# Patient Record
Sex: MALE | Race: WHITE | Employment: OTHER | URBAN - METROPOLITAN AREA
[De-identification: names, ages, dates, MRNs, and addresses within clinical notes are randomized per-mention and may not be internally consistent; named-entity substitution may affect disease eponyms.]

---

## 2020-09-17 ENCOUNTER — LAB REQUISITION (OUTPATIENT)
Dept: LAB | Facility: HOSPITAL | Age: 85
End: 2020-09-17
Payer: MEDICARE

## 2020-09-17 DIAGNOSIS — R82.90 UNSPECIFIED ABNORMAL FINDINGS IN URINE: ICD-10-CM

## 2020-09-17 LAB
BILIRUB UR QL: NEGATIVE
COLOR UR: YELLOW
GLUCOSE UR-MCNC: NEGATIVE MG/DL
HGB UR QL STRIP.AUTO: NEGATIVE
KETONES UR-MCNC: NEGATIVE MG/DL
NITRITE UR QL STRIP.AUTO: NEGATIVE
PH UR: 6 [PH] (ref 5–8)
PROT UR-MCNC: 30 MG/DL
RBC #/AREA URNS AUTO: 43 /HPF
SP GR UR STRIP: 1.02 (ref 1–1.03)
UROBILINOGEN UR STRIP-ACNC: <2
WBC #/AREA URNS AUTO: 836 /HPF

## 2020-09-17 PROCEDURE — 87077 CULTURE AEROBIC IDENTIFY: CPT | Performed by: UROLOGY

## 2020-09-17 PROCEDURE — 81001 URINALYSIS AUTO W/SCOPE: CPT | Performed by: UROLOGY

## 2020-09-17 PROCEDURE — 87186 SC STD MICRODIL/AGAR DIL: CPT | Performed by: UROLOGY

## 2020-09-17 PROCEDURE — 87086 URINE CULTURE/COLONY COUNT: CPT | Performed by: UROLOGY

## 2020-10-12 ENCOUNTER — APPOINTMENT (OUTPATIENT)
Dept: GENERAL RADIOLOGY | Facility: HOSPITAL | Age: 85
DRG: 689 | End: 2020-10-12
Attending: EMERGENCY MEDICINE
Payer: MEDICARE

## 2020-10-12 ENCOUNTER — HOSPITAL ENCOUNTER (INPATIENT)
Facility: HOSPITAL | Age: 85
LOS: 7 days | Discharge: NURSING, REHAB OR LONG-TERM CARE | DRG: 689 | End: 2020-10-19
Attending: EMERGENCY MEDICINE | Admitting: HOSPITALIST
Payer: MEDICARE

## 2020-10-12 ENCOUNTER — APPOINTMENT (OUTPATIENT)
Dept: CT IMAGING | Facility: HOSPITAL | Age: 85
DRG: 689 | End: 2020-10-12
Attending: EMERGENCY MEDICINE
Payer: MEDICARE

## 2020-10-12 DIAGNOSIS — I48.92 ATRIAL FIBRILLATION AND FLUTTER (HCC): ICD-10-CM

## 2020-10-12 DIAGNOSIS — I48.91 ATRIAL FIBRILLATION AND FLUTTER (HCC): ICD-10-CM

## 2020-10-12 DIAGNOSIS — N30.00 ACUTE CYSTITIS WITHOUT HEMATURIA: ICD-10-CM

## 2020-10-12 DIAGNOSIS — G93.41 METABOLIC ENCEPHALOPATHY: Primary | ICD-10-CM

## 2020-10-12 PROBLEM — R79.89 AZOTEMIA: Status: ACTIVE | Noted: 2020-10-12

## 2020-10-12 PROBLEM — R73.9 HYPERGLYCEMIA: Status: ACTIVE | Noted: 2020-10-12

## 2020-10-12 PROCEDURE — 99223 1ST HOSP IP/OBS HIGH 75: CPT | Performed by: HOSPITALIST

## 2020-10-12 PROCEDURE — 71045 X-RAY EXAM CHEST 1 VIEW: CPT | Performed by: EMERGENCY MEDICINE

## 2020-10-12 PROCEDURE — 70450 CT HEAD/BRAIN W/O DYE: CPT | Performed by: EMERGENCY MEDICINE

## 2020-10-12 PROCEDURE — 74177 CT ABD & PELVIS W/CONTRAST: CPT | Performed by: EMERGENCY MEDICINE

## 2020-10-12 RX ORDER — WARFARIN SODIUM 4 MG/1
4 TABLET ORAL NIGHTLY
Status: ON HOLD | COMMUNITY
End: 2020-10-16

## 2020-10-12 RX ORDER — LORAZEPAM 2 MG/ML
0.5 INJECTION INTRAMUSCULAR ONCE
Status: COMPLETED | OUTPATIENT
Start: 2020-10-12 | End: 2020-10-12

## 2020-10-12 RX ORDER — MELATONIN
50 DAILY
COMMUNITY

## 2020-10-12 RX ORDER — AMINO ACIDS/MV,IRON,MIN
1 TABLET ORAL
COMMUNITY

## 2020-10-12 RX ORDER — DILTIAZEM HYDROCHLORIDE 5 MG/ML
10 INJECTION INTRAVENOUS ONCE
Status: COMPLETED | OUTPATIENT
Start: 2020-10-12 | End: 2020-10-12

## 2020-10-12 RX ORDER — TAMSULOSIN HYDROCHLORIDE 0.4 MG/1
0.4 CAPSULE ORAL DAILY
COMMUNITY

## 2020-10-12 RX ORDER — ACETAMINOPHEN 325 MG/1
650 TABLET ORAL EVERY 6 HOURS PRN
Status: DISCONTINUED | OUTPATIENT
Start: 2020-10-12 | End: 2020-10-19

## 2020-10-12 RX ORDER — MELATONIN
1000 DAILY
COMMUNITY

## 2020-10-12 RX ORDER — MELATONIN
800 DAILY
COMMUNITY

## 2020-10-12 RX ORDER — ACETAMINOPHEN 325 MG/1
325 TABLET ORAL EVERY 6 HOURS PRN
COMMUNITY

## 2020-10-12 RX ORDER — SODIUM CHLORIDE 0.9 % (FLUSH) 0.9 %
3 SYRINGE (ML) INJECTION AS NEEDED
Status: DISCONTINUED | OUTPATIENT
Start: 2020-10-12 | End: 2020-10-19

## 2020-10-12 RX ORDER — METOCLOPRAMIDE HYDROCHLORIDE 5 MG/ML
10 INJECTION INTRAMUSCULAR; INTRAVENOUS EVERY 8 HOURS PRN
Status: DISCONTINUED | OUTPATIENT
Start: 2020-10-12 | End: 2020-10-19

## 2020-10-12 RX ORDER — MULTIVIT-MIN/IRON FUM/FOLIC AC 7.5 MG-4
1 TABLET ORAL DAILY
COMMUNITY

## 2020-10-12 RX ORDER — ATORVASTATIN CALCIUM 10 MG/1
10 TABLET, FILM COATED ORAL NIGHTLY
COMMUNITY

## 2020-10-12 RX ORDER — BETHANECHOL CHLORIDE 25 MG/1
25 TABLET ORAL 3 TIMES DAILY
Status: ON HOLD | COMMUNITY
Start: 2020-10-06 | End: 2020-10-16

## 2020-10-12 RX ORDER — POLYETHYLENE GLYCOL 3350 17 G/17G
17 POWDER, FOR SOLUTION ORAL DAILY PRN
COMMUNITY

## 2020-10-12 RX ORDER — CYCLOBENZAPRINE HCL 5 MG
5 TABLET ORAL 2 TIMES DAILY
Status: ON HOLD | COMMUNITY
End: 2020-10-16

## 2020-10-12 RX ORDER — ONDANSETRON 2 MG/ML
4 INJECTION INTRAMUSCULAR; INTRAVENOUS EVERY 6 HOURS PRN
Status: DISCONTINUED | OUTPATIENT
Start: 2020-10-12 | End: 2020-10-19

## 2020-10-12 RX ORDER — MINERAL OIL AND PETROLATUM 150; 830 MG/G; MG/G
OINTMENT OPHTHALMIC DAILY
COMMUNITY

## 2020-10-12 RX ORDER — SODIUM CHLORIDE 9 MG/ML
INJECTION, SOLUTION INTRAVENOUS CONTINUOUS
Status: DISCONTINUED | OUTPATIENT
Start: 2020-10-12 | End: 2020-10-13

## 2020-10-12 RX ORDER — WARFARIN SODIUM 6 MG/1
6 TABLET ORAL NIGHTLY
Status: ON HOLD | COMMUNITY
End: 2020-10-16

## 2020-10-12 RX ORDER — ASPIRIN 81 MG/1
81 TABLET, CHEWABLE ORAL DAILY
COMMUNITY

## 2020-10-12 RX ORDER — METOPROLOL TARTRATE 5 MG/5ML
5 INJECTION INTRAVENOUS EVERY 4 HOURS
Status: DISCONTINUED | OUTPATIENT
Start: 2020-10-12 | End: 2020-10-12

## 2020-10-12 RX ORDER — CHOLECALCIFEROL (VITAMIN D3) 125 MCG
500 CAPSULE ORAL DAILY
COMMUNITY

## 2020-10-12 RX ORDER — METOPROLOL SUCCINATE 50 MG/1
50 TABLET, EXTENDED RELEASE ORAL DAILY
Status: ON HOLD | COMMUNITY
End: 2020-10-16

## 2020-10-12 RX ORDER — ASCORBIC ACID 500 MG
500 TABLET ORAL DAILY
COMMUNITY

## 2020-10-12 RX ORDER — TRAMADOL HYDROCHLORIDE 50 MG/1
50 TABLET ORAL EVERY 6 HOURS PRN
Status: ON HOLD | COMMUNITY
End: 2020-10-16

## 2020-10-12 RX ORDER — METOPROLOL SUCCINATE 100 MG/1
100 TABLET, EXTENDED RELEASE ORAL DAILY
Status: ON HOLD | COMMUNITY
End: 2020-10-16

## 2020-10-12 NOTE — ED NOTES
Orders for admission, patient is aware of plan and ready to go upstairs. Any questions, please call ED RN Radha Mims  at extension [phone]. Pt sent from [place] [facility] for increased AMS.  Pt is normally confused and disoriented due to dementia but today the nurs

## 2020-10-12 NOTE — ED PROVIDER NOTES
Patient Seen in: [facility] AND [facility] Emergency Department      History   Patient presents with:  Altered Mental Status    Stated Complaint: ams    HPI    Presents to the emergency department with altered mental status.   History is obtained from the paramed Mouth: Mucous membranes are moist.   Eyes:      Extraocular Movements: Extraocular movements intact. Conjunctiva/sclera: Conjunctivae normal.      Pupils: Pupils are equal, round, and reactive to light.    Neck:      Musculoskeletal: Normal range of mo other components within normal limits   CBC W/ DIFFERENTIAL - Abnormal; Notable for the following components:    WBC 13.6 (*)     RDW-SD 51.2 (*)     Neutrophil Absolute Prelim 9.61 (*)     Neutrophil Absolute 9.61 (*)     Monocyte Absolute 1.33 (*)     Al 10/12/2020 at 2:51 PM          Ct Abdomen Pelvis Iv Contrast, No Oral (er)    Result Date: 10/12/2020  CONCLUSION:   Bladder wall thickening, greater than what is expected for under distention can be seen with cystitis.  Correlation with urinalysis suggeste

## 2020-10-12 NOTE — ED NOTES
Pt straight catheter used to collect urine from pt. Pt tolerated well. Redness, swelling, foul smell, and purulent drainage noted to akosua area and urethral meatus.

## 2020-10-12 NOTE — H&P
[facility]    PATIENT'S NAME: Bernabe Hodge   ATTENDING PHYSICIAN: Cecelia Stephen.  Israel Paula MD   PATIENT ACCOUNT#:   [id number]    LOCATION:  [place] [address]  MEDICAL RECORD #:   [id number]       YOB: 1935  ADMISSION DATE:       10/1 able to provide any further details. MEDICATIONS:  Please see medication reconciliation list.     ALLERGIES:  No known drug allergies. FAMILY HISTORY:  Unable to obtain from patient. SOCIAL HISTORY:  Lives in a nursing facility.   Requires assi 15:58:21  t: 10/12/2020 16:32:42  Job [id number]  FB/

## 2020-10-12 NOTE — CM/SW NOTE
CM spoke with POA daughter Laure Rodriguez via phone.  patient lives at [facility] and per Laure Rodriguez \"I think my dad needs more care than what [facility] can provide\"  patient has a history of [facility]    Daughter requesting Psych MD consult, 3rd floor Rn w

## 2020-10-12 NOTE — ED INITIAL ASSESSMENT (HPI)
Pt arrived via medics to rm 38, from [facility], for complaint of ams, per medics staff at [facility] state that pt last known well was yesterday, pt refused VS via medics, unknown procedure last week, medics also noticed dried blood in pts toilet.   Pt has becom

## 2020-10-12 NOTE — CONSULTS
[facility]OSP - [facility]    Cardiology Consultation  [facility]    Susan Redding Patient Status:  Emergency    1935 MRN [id number]   Location [address] Attending MD En Gamble file    Social History Narrative    None on file      Current Medications:    •  cefTRIAXone Sodium (ROCEPHIN) 1 g in sodium chloride 0.9% 100 mL MBP/ADD-vantage, 1 g, Intravenous, Once    •  Meropenem (MERREM) 500 mg in sodium chloride 0.9% 100 mL MBP, 50 (VITAMIN B-6) 50 MG tablet **ONE-HALF (1/2) TABLET** (25MG) BY MOUTH ONCE DAILY   • Pyridoxine HCl (VITAMIN B-6, PYRIDOXINE,) 25 MG tablet Take 25 mg by mouth daily. • Sulfacetamide Sodium-Sulfur 10-5 % Lotion Apply 1 application topically daily.    • rodriguez or dullness. Normal excursions and effort. Abdomen: Soft, non-tender. No organosplenomegally, mass or rebound, BS-present. Extremities: Without clubbing, cyanosis or edema. Peripheral pulses are 2+. Neurologic: Alert and oriented, normal affect.  No fo Contrast, No Oral (er)    Result Date: 10/12/2020  CONCLUSION:   Bladder wall thickening, greater than what is expected for under distention can be seen with cystitis. Correlation with urinalysis suggested.   Small foci of gas are seen within the bladder wh

## 2020-10-13 PROCEDURE — 99233 SBSQ HOSP IP/OBS HIGH 50: CPT | Performed by: HOSPITALIST

## 2020-10-13 RX ORDER — TAMSULOSIN HYDROCHLORIDE 0.4 MG/1
0.4 CAPSULE ORAL DAILY
Status: DISCONTINUED | OUTPATIENT
Start: 2020-10-13 | End: 2020-10-19

## 2020-10-13 RX ORDER — BETHANECHOL CHLORIDE 25 MG/1
25 TABLET ORAL 3 TIMES DAILY
Status: DISCONTINUED | OUTPATIENT
Start: 2020-10-13 | End: 2020-10-19

## 2020-10-13 RX ORDER — CYCLOBENZAPRINE HCL 5 MG
5 TABLET ORAL 2 TIMES DAILY PRN
Status: DISCONTINUED | OUTPATIENT
Start: 2020-10-13 | End: 2020-10-19

## 2020-10-13 RX ORDER — TRAMADOL HYDROCHLORIDE 50 MG/1
50 TABLET ORAL EVERY 6 HOURS PRN
Status: DISCONTINUED | OUTPATIENT
Start: 2020-10-13 | End: 2020-10-19

## 2020-10-13 RX ORDER — MELATONIN
800 DAILY
Status: DISCONTINUED | OUTPATIENT
Start: 2020-10-13 | End: 2020-10-19

## 2020-10-13 RX ORDER — MINERAL OIL AND PETROLATUM 150; 830 MG/G; MG/G
OINTMENT OPHTHALMIC DAILY
Status: DISCONTINUED | OUTPATIENT
Start: 2020-10-13 | End: 2020-10-19

## 2020-10-13 RX ORDER — POLYETHYLENE GLYCOL 3350 17 G/17G
17 POWDER, FOR SOLUTION ORAL DAILY PRN
Status: DISCONTINUED | OUTPATIENT
Start: 2020-10-13 | End: 2020-10-19

## 2020-10-13 RX ORDER — MELATONIN
50 DAILY
Status: DISCONTINUED | OUTPATIENT
Start: 2020-10-13 | End: 2020-10-19

## 2020-10-13 RX ORDER — WARFARIN SODIUM 4 MG/1
4 TABLET ORAL
Status: COMPLETED | OUTPATIENT
Start: 2020-10-13 | End: 2020-10-13

## 2020-10-13 RX ORDER — CHOLECALCIFEROL (VITAMIN D3) 125 MCG
500 CAPSULE ORAL DAILY
Status: DISCONTINUED | OUTPATIENT
Start: 2020-10-13 | End: 2020-10-19

## 2020-10-13 RX ORDER — ASPIRIN 81 MG/1
81 TABLET, CHEWABLE ORAL DAILY
Status: DISCONTINUED | OUTPATIENT
Start: 2020-10-13 | End: 2020-10-19

## 2020-10-13 RX ORDER — HALOPERIDOL 5 MG/ML
1 INJECTION INTRAMUSCULAR EVERY 6 HOURS PRN
Status: DISCONTINUED | OUTPATIENT
Start: 2020-10-13 | End: 2020-10-14

## 2020-10-13 RX ORDER — ATORVASTATIN CALCIUM 10 MG/1
10 TABLET, FILM COATED ORAL NIGHTLY
Status: DISCONTINUED | OUTPATIENT
Start: 2020-10-13 | End: 2020-10-19

## 2020-10-13 RX ORDER — MULTIPLE VITAMINS W/ MINERALS TAB 9MG-400MCG
1 TAB ORAL DAILY
Status: DISCONTINUED | OUTPATIENT
Start: 2020-10-13 | End: 2020-10-19

## 2020-10-13 RX ORDER — SODIUM CHLORIDE 450 MG/100ML
INJECTION, SOLUTION INTRAVENOUS CONTINUOUS
Status: DISCONTINUED | OUTPATIENT
Start: 2020-10-13 | End: 2020-10-14

## 2020-10-13 RX ORDER — ASCORBIC ACID 500 MG
500 TABLET ORAL DAILY
Status: DISCONTINUED | OUTPATIENT
Start: 2020-10-13 | End: 2020-10-19

## 2020-10-13 NOTE — CONSULTS
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 -  FU cx.  -  Follow fever curve, wbc. -  Reviewed labs, micro, imaging reports, available old records. -  Case d/w patient, RN.     Thank you for allowing us to par

## 2020-10-13 NOTE — CM/SW NOTE
Received MDO for d/c planning. Per RN rounds, pt is extremely confused and slightly combative. JALEN contacted pt's POA HC/dtr, Therese, via phone. She confirmed pt lives at [facility] and has been there since the end of July 2020.  She stated Fredo castellanos initiated referral.     DON screen requested. Liseth at [facility] requests a call when SNF placement is finalized. Need insurance authorization prior to d/c.       PLAN: SNF, pend accept & dtr's final choice, pend med clear    SW/CM to remain available f

## 2020-10-13 NOTE — PROGRESS NOTES
[facility]    Cardiology Progress Note  [facility]    Lo Walker Patient Status:  Inpatient    1935 MRN [id number]   Location [facility] 3W/SW Attending Wallis Lombard, MD   Hosp Day # 1 PCP [facility] Net I/O     -- [phone]          Vent Settings:      Hemodynamic parameters (last 24 hours):      Scheduled Meds:   • metoprolol Tartrate  25 mg Oral Q8H [facility]   • meropenem  500 mg Intravenous Q8H       Continuous Infusions:   • sodium chloride 100 mL/hr 10/12/2020 at 2:48 PM     Finalized by (CST): Christine Davila MD on 10/12/2020 at 2:51 PM          Ct Abdomen Pelvis Iv Contrast, No Oral (er)    Result Date: 10/12/2020  CONCLUSION:   Bladder wall thickening, greater than what is expected for under [facility] long-acting as able.             Kassandra August MD  10/13/2020

## 2020-10-13 NOTE — PHYSICAL THERAPY NOTE
Chart reviewed , attempt x 2   in PM  .   Per RN [id number] Angelica Sung to try again this PM .      Pt received again supine in bed with food tray . Per tray food delivered approximately 1 hour ago. Therapy again introducing therapy POC , herlinda Dey present.

## 2020-10-13 NOTE — PHYSICAL THERAPY NOTE
Chart reviewed ,  RN approve participation. Pt received supine in bed , meal tray just arriving. Therapy encouraging pt participation to get up in chair for meal.    Therapy POC introduced. Pt declining participation at this time .   \" I am not get

## 2020-10-13 NOTE — PLAN OF CARE
Problem: Patient Centered Care  Goal: Patient preferences are identified and integrated in the patient's plan of care  Description: Interventions:  - What would you like us to know as we care for you?   - Provide timely, complete, and accurate informatio attention, thought processing and/or memory  Description: Interventions:    Outcome: Progressing     Problem: Patient/Family Goals  Goal: Patient/Family Long Term Goal  Description: Patient's Long Term Goal:     Interventions:  -   - See additional Care Pl

## 2020-10-13 NOTE — PLAN OF CARE
Pt is somnolent and lethargic. Was not able to give PO meds safely. Cardizem gtt @ 5ml, 0.9 @ 100ml, and meropenem @ 33ml. Plan is for PT/OT eval and psych consult.     Problem: Patient Centered Care  Goal: Patient preferences are identified and integrated Encourage visually impaired, hearing impaired and aphasic patients to use assistive/communication devices  Outcome: Progressing     Problem: Impaired Cognition  Goal: Patient will exhibit improved attention, thought processing and/or memory  Description: I

## 2020-10-13 NOTE — OCCUPATIONAL THERAPY NOTE
OCCUPATIONAL THERAPY EVALUATION - INPATIENT     Room Number: 348/348-A  Evaluation Date: 10/13/2020  Type of Evaluation: Initial  Presenting Problem: AMS, elevated INR, acute cystitis    Physician Order: IP Consult to Occupational Therapy  Reason for [facility] Plan: Balance activities; Energy conservation/work simplification techniques;ADL training;Functional transfer training; Endurance training;Patient/Family education;Cognitive reorientation;Equipment eval/education; Compensatory technique education       [address] [facility] functional limits     STRENGTH ASSESSMENT  Upper extremity strength is within functional limits     COORDINATION  Gross Motor: WFL   Fine Motor: WFL       ACTIVITIES OF DAILY LIVING ASSESSMENT  AM-PAC ‘6-Clicks’ Inpatient Daily Activity Short Form  How muc

## 2020-10-13 NOTE — PROGRESS NOTES
[place] [facility] - [facility]  Progress Note     Bin Lema  : 1935    Status: Inpatient  Day #: 1    Attending: Lucy Schofield MD  PCP: Sweetie Perez MD      Assessment and Plan     Acute metabolic encephalopathy 2/2 UTI  Dementia  -CT brain [facility] MCV 99.5 102.6*   MCH 33.6 33.2   MCHC 33.8 32.4   RDW 14.2 14.0   NEPRELIM 9.61* 6.87     Recent Labs   Lab 10/12/20  1343 10/13/20  0545   BUN 40* 27*   CREATSERUM 1.13 0.89   GFRAA 69 91   GFRNAA 60 79   CA 9.6 8.7    149*   K 5.0 4.4    1 10/12/2020 at 3:06 PM     Finalized by (CST): Alex Maharaj MD on 10/12/2020 at 3:18 PM          Ekg 12-lead    Result Date: 10/12/2020  ECG Report  Interpretation  -------------------------- Atrial Fibrillation with Rapid Ventricular Response ABNORMAL RHYT

## 2020-10-14 PROCEDURE — 99233 SBSQ HOSP IP/OBS HIGH 50: CPT | Performed by: HOSPITALIST

## 2020-10-14 RX ORDER — HALOPERIDOL 5 MG/ML
2 INJECTION INTRAMUSCULAR EVERY 6 HOURS PRN
Status: DISCONTINUED | OUTPATIENT
Start: 2020-10-14 | End: 2020-10-19

## 2020-10-14 RX ORDER — WARFARIN SODIUM 5 MG/1
5 TABLET ORAL NIGHTLY
Status: DISCONTINUED | OUTPATIENT
Start: 2020-10-14 | End: 2020-10-15

## 2020-10-14 NOTE — PROGRESS NOTES
[place] [facility] - [facility] PROGRESS NOTE    Luis Torres Patient Status:  Inpatient    1935 MRN [id number]   Location [facility] 3W/SW Attending Mee Rojas MD   Hosp Day # 2 PCP Baldo Barger MD     Subjective:  Valeria Mcgarry cx with ESBL E. 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 Will plan for likely two week course (EOT 10/27/20). Will place midline at this time.   -  Follow fever curve

## 2020-10-14 NOTE — PROGRESS NOTES
[facility]     [facility] Cardiology Progress Note    Aaron Marie Patient Status:  Inpatient    1935 MRN [id number]   Location [facility] 3W/SW Attending Bartolo Bamberger, MD   Hosp Day # 2 PCP Sandra Linder MD     Subjective:   Has * 85 98   BUN 40* 27* 20*   CREATSERUM 1.13 0.89 0.66*   GFRAA 69 91 103   GFRNAA 60 79 89   CA 9.6 8.7 7.9*    149* 144   K 5.0 4.4 3.9    117* 115*   CO2 24.0 23.0 25.0        Recent Labs   Lab 10/12/20  1343   TROP <0.045     No res

## 2020-10-14 NOTE — PLAN OF CARE
Patient remains safe in environment, bed alarm maintained. He is irritable at times, yells out loudly with the slightest touch. Occasionally belligerent - says that he needs things done \"the way he says\". Haldol given x1 for increased agitation.  Up to Monitor swallowing and airway patency with patient fatigue and changes in neurological status  - Encourage and assist patient to increase activity and self care with guidance from PT/OT  - Encourage visually impaired, hearing impaired and aphasic patients

## 2020-10-14 NOTE — PAYOR COMM NOTE
--------------  CONTINUED STAY REVIEW    Payor: Monique Marcos  #:  [id number]  Authorization Number: [id number]    10/13      A FIB CONTS IV CARDIZEM  UTI/ + AMS  ACUTE METABOLIC ENCEPHALOPATHY  CONFUSED, COMBATIVE  URINARY RETENTONS     CONTS ON CA

## 2020-10-14 NOTE — PHYSICAL THERAPY NOTE
PHYSICAL THERAPY EVALUATION - INPATIENT     Room Number: 348/348-A  Evaluation Date: 10/14/2020  Type of Evaluation: Initial   Physician Order: PT Eval and Treat    Presenting Problem: metabolic encephalopathy  Reason for Therapy: Mobility Dysfunction and Hyperglycemia    Acute cystitis without hematuria    Atrial fibrillation and flutter [facility])      Past Medical History  Past Medical History:   Diagnosis Date   • A-fib ([facility]    • Dementia ([facility]    • High blood pressure    • High cholesterol    • Myasthenia INPATIENT SHORT FORM - BASIC MOBILITY  How much difficulty does the patient currently have. ..  -   Turning over in bed (including adjusting bedclothes, sheets and blankets)?: A Little   -   Sitting down on and standing up from a chair with arms (e.g., whee with home activity/exercise instructions provided to patient in preparation for discharge.    Goal #5   Current Status    Goal #6    Goal #6  Current Status

## 2020-10-14 NOTE — PROGRESS NOTES
[place] [facility] - [facility]    Progress Note    Mic Luke Patient Status:  Inpatient    1935 MRN [id number]   Location [facility] 3W/SW Attending Benja Jimenez MD   Hosp Day # 2 PCP Pallavi Mcgowan MD       Subjective:     Pt denies martha expected for under distention can be seen with cystitis. Correlation with urinalysis suggested. Small foci of gas are seen within the bladder which are nonspecific. Correlate for recent intervention.   If none, a colovesicular fistula would need to be cli

## 2020-10-14 NOTE — PAYOR COMM NOTE
--------------  ADMISSION REVIEW     Payor: [address]            Patient Seen in: [facility] Emergency Department      History   Patient presents with:  Altered Mental Status    Stated Complaint: ams    Presents to the emergency department with al There is abdominal tenderness. There is no guarding or rebound. Musculoskeletal: Normal range of motion. General: No tenderness. Skin:     General: Skin is warm and dry. Findings: No rash.    Neurological:      General: No focal deficit pre CBC W/ DIFFERENTIAL[[id number]]          Abnormal            Final result                 Please view results for these tests on the individual orders.    TROPONIN I   RAINBOW DRAW BLUE   RAINBOW DRAW LAVENDER   RAINBOW DRAW LIGHT GREEN   RAINBOW DR comparison with prior imaging is recommended to demonstrate stability. If none available, followup nonemergent MRI of the lumbar spine can further characterize. Diverticulosis without diverticulitis. Hepatic steatosis.      Dictated by (CST): Aden Hirsch, fibrillation with rapid ventricular response with rate of 120. His urinalysis after straight cath was suggestive of urinary tract infection. Blood pressure upon arrival was 99/59. The patient was started on IV meropenem.   CT scan of the abdomen showed c sclerae. Pupils equal, round, and reactive. NECK:  Supple. No lymphadenopathy. Trachea midline. Full range of motion. LUNGS:  Clear to auscultation bilaterally. Normal respiratory effort. No intercostal retractions.    HEART:  Irregularly irregula Oral Althea Valdez RN      haloperidol lactate (HALDOL) 5 MG/ML injection 1 mg     Date Action Dose Route User    10/14/2020 0237 Given 1 mg Intravenous Patricia Cope RN      Meropenem (MERREM) 500 mg in sodium chloride 0.9% 100 mL MBP     Date Action

## 2020-10-14 NOTE — CM/SW NOTE
JALEN obtained list of accepting SNFs via [employer]. JALEN contacted pt's dtr/KATHERINE Damon and discussed w/ her. Per her request, JALEN emailed the list to: [email or web address].     Per Therese's request, JALEN also contacted pt's RN and Dr. Toby Hoyt and requested

## 2020-10-15 PROCEDURE — 99233 SBSQ HOSP IP/OBS HIGH 50: CPT | Performed by: HOSPITALIST

## 2020-10-15 PROCEDURE — 99222 1ST HOSP IP/OBS MODERATE 55: CPT | Performed by: NURSE PRACTITIONER

## 2020-10-15 RX ORDER — WARFARIN SODIUM 3 MG/1
6 TABLET ORAL NIGHTLY
Status: DISCONTINUED | OUTPATIENT
Start: 2020-10-15 | End: 2020-10-16

## 2020-10-15 RX ORDER — VANCOMYCIN HYDROCHLORIDE 125 MG/1
125 CAPSULE ORAL DAILY
Status: DISCONTINUED | OUTPATIENT
Start: 2020-10-15 | End: 2020-10-19

## 2020-10-15 RX ORDER — METOPROLOL TARTRATE 50 MG/1
50 TABLET, FILM COATED ORAL EVERY 8 HOURS SCHEDULED
Status: DISCONTINUED | OUTPATIENT
Start: 2020-10-15 | End: 2020-10-19

## 2020-10-15 NOTE — PROGRESS NOTES
[place] [facility] - [facility] ID PROGRESS NOTE    Jose Briceño Patient Status:  Inpatient    1935 MRN [id number]   Location [facility] 3W/SW Attending Ady Turner, [address] [facility] [address] Day # 3 PCP Yariel Villagomez MD     Subjective:  Bia Peacock on meropenem. Combative overnight.  ID consulted.     # ESBL E. Coli pyelonephritis  # AMS with pyuria with recent urine cx with ESBL E. 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

## 2020-10-15 NOTE — PROGRESS NOTES
[facility]    Progress Note    Troy Cornejo Patient Status:  Inpatient    1935 MRN [id number]   Location [facility] 3W/SW Attending Mary Sofia MD   Hosp Day # 3 PCP Steven Culver MD       Subjective:     Pt denies martha INR     CAD s/p stent  - cont meds and monitor     Hypernatremia  Resolved. - off IVF     Other:  -HTN  -HL  -h/o red cell aplasia  -chronic L1 vertebral compression fx  -myasthenia gravis     dvt proph:    Elevated INR on coumadin     Code status:     Ful

## 2020-10-15 NOTE — PLAN OF CARE
VS stable. Denies any pain. On room air. AFIB on tele. Pt alert and oriented but also confused and impulsive. Bed and chair alarm on. Frequent rounds done. On Meropenem IV. Midline to be placed tomorrow.  Telephone consent obtained from daughter Radha Escobedo kevin Encourage and assist patient to increase activity and self care with guidance from PT/OT  - Encourage visually impaired, hearing impaired and aphasic patients to use assistive/communication devices  Outcome: Progressing     Problem: Impaired Cognition  [facility]

## 2020-10-15 NOTE — PLAN OF CARE
Patient alert, confused at times. Reorientation required. No distress noted. HR remain elevated. 2 doses of metoprolol given. Bed locked, alarm on. Call light within reach. Will continue to monitor.    Problem: Patient Centered Care  Goal: Patient preferenc volume within ordered parameters to optimize cerebral perfusion and minimize risk of hemorrhage  - Monitor temperature, glucose, and sodium.  Initiate appropriate interventions as ordered  Outcome: Progressing  Goal: Achieves maximal functionality and self

## 2020-10-15 NOTE — PROGRESS NOTES
[facility]    Cardiology Progress Note  [facility]    Jenny Saul Patient Status:  Inpatient    1935 MRN [id number]   [facility] 3W/SW Attending Joshua Farah, [address] [facility]  Day # 3 PCP Juana Caraballo multivitamin with minerals  1 tablet Oral Daily   • vitamin B-6 (pyridoxine)  50 mg Oral Daily   • tamsulosin HCl  0.4 mg Oral Daily   • Vitamin B-12  500 mcg Oral Daily   • Vitamin C  500 mg Oral Daily   • cholecalciferol  1,000 Units Oral Daily   • merop bibasilar markings, left greater than right with minimal left basilar pleural reaction. Findings may be acute or chronic. No comparison studies.     Dictated by (CST): Ruma Ott MD on 10/12/2020 at 2:48 PM     Finalized by (CST): Mery Cortez persistently 110-130, increase metoprolol to 50 mg Q8hr today   - on coumadin     CAD s/p PCI LAD 2013:  - cont ASA, atorva, BB     Urosepsis: management as per primary svc    Valerie Batista MD  Cardiology [facility]  10/15/2020

## 2020-10-15 NOTE — CM/SW NOTE
10: 55AM  SW contacted pt's dtr, Therese. She confirmed she received list of accepting SNFs. She is still reviewing and confirmed she will reach out to SW w/ 1st and 2nd choice this afternoon.     01:00PM  Received call from pt's dtr inquiring about psychia

## 2020-10-15 NOTE — CONSULTS
[facility]  Report of Consultation    Aravind Thompson Patient Status:  Inpatient    1935 MRN [id number]   Location [facility] 3W/SW Attending Favian Khan MD   Hosp Day # 3 PCP Trav Tong MD was seen by urology Dr. Lyndon Solorio on 10/9 where he had noriega removal and trial of void. Noriega catheter was replaced this admission with unknown amount out upon placement, however 1200 ml is documented in first 7 hours post placement.       Patient denie by mouth daily as needed. •  tamsulosin (FLOMAX) cap, Take 0.4 mg by mouth daily. •  traMADol HCl 50 MG Oral Tab, Take 50 mg by mouth every 6 (six) hours as needed for Pain. •  Vitamin B-12 500 MCG Oral Tab, Take 500 mcg by mouth daily.     •  Guerline Gonsales Neurological: He is alert and oriented to person, place, and time. Skin: Skin is warm and dry. Psychiatric: He has a normal mood and affect.        Results:     Lab Results   Component Value Date    WBC 9.0 10/15/2020    HGB 12.1 (L) 10/15/2020    HCT for acute urological intervention at this time.  -Rest of care per primary care team  -Will follow peripherally    Discussed with patient, RN, and Dr. Oseas Arteaga     I spent 50 minutes with patient, and greater than 50% of this time was spent face to face [facility]

## 2020-10-16 ENCOUNTER — APPOINTMENT (OUTPATIENT)
Dept: PICC SERVICES | Facility: HOSPITAL | Age: 85
DRG: 689 | End: 2020-10-16
Attending: PHYSICIAN ASSISTANT
Payer: MEDICARE

## 2020-10-16 PROCEDURE — 05HC33Z INSERTION OF INFUSION DEVICE INTO LEFT BASILIC VEIN, PERCUTANEOUS APPROACH: ICD-10-PCS | Performed by: HOSPITALIST

## 2020-10-16 PROCEDURE — 99233 SBSQ HOSP IP/OBS HIGH 50: CPT | Performed by: HOSPITALIST

## 2020-10-16 RX ORDER — LIDOCAINE HYDROCHLORIDE 10 MG/ML
5 INJECTION, SOLUTION INFILTRATION; PERINEURAL AS NEEDED
Status: DISCONTINUED | OUTPATIENT
Start: 2020-10-16 | End: 2020-10-19

## 2020-10-16 RX ORDER — METOPROLOL TARTRATE 50 MG/1
50 TABLET, FILM COATED ORAL EVERY 8 HOURS SCHEDULED
Refills: 0 | Status: SHIPPED | COMMUNITY
Start: 2020-10-16

## 2020-10-16 RX ORDER — VANCOMYCIN HYDROCHLORIDE 125 MG/1
125 CAPSULE ORAL DAILY
Qty: 18 CAPSULE | Refills: 0 | Status: SHIPPED | OUTPATIENT
Start: 2020-10-16 | End: 2020-11-03

## 2020-10-16 RX ORDER — CYCLOBENZAPRINE HCL 5 MG
5 TABLET ORAL 2 TIMES DAILY PRN
Qty: 10 TABLET | Refills: 0 | Status: SHIPPED | OUTPATIENT
Start: 2020-10-16

## 2020-10-16 RX ORDER — BETHANECHOL CHLORIDE 25 MG/1
25 TABLET ORAL 3 TIMES DAILY
Qty: 21 TABLET | Refills: 0 | Status: SHIPPED | OUTPATIENT
Start: 2020-10-16 | End: 2020-10-23

## 2020-10-16 RX ORDER — WARFARIN SODIUM 7.5 MG/1
7.5 TABLET ORAL NIGHTLY
Refills: 0 | Status: SHIPPED | COMMUNITY
Start: 2020-10-16 | End: 2020-10-19

## 2020-10-16 RX ORDER — TRAMADOL HYDROCHLORIDE 50 MG/1
50 TABLET ORAL EVERY 6 HOURS PRN
Qty: 10 TABLET | Refills: 0 | Status: SHIPPED | OUTPATIENT
Start: 2020-10-16

## 2020-10-16 NOTE — PHYSICAL THERAPY NOTE
PHYSICAL THERAPY TREATMENT NOTE - INPATIENT     Room Number: 348/348-A       Presenting Problem: metabolic encephalopathy    Problem List  Principal Problem:    Metabolic encephalopathy  Active Problems:    Azotemia    Hyperglycemia    Acute cystitis witho Sitting: Good  Dynamic Sitting: Fair +           Static Standing: Fair  Dynamic Standing: Fair -    ACTIVITY TOLERANCE                         O2 WALK                  AM-PAC '6-Clicks' INPATIENT SHORT FORM - BASIC MOBILITY  How much difficulty does the pa rolling      Goal #2  Current Status SBA with RW   Goal #3 Patient is able to ambulate 100 feet with assist device: walker - rolling at assistance level: supervision   Goal #3   Current Status    SBA with RW SBA with RW amb 60' with a step though gait jose

## 2020-10-16 NOTE — CM/SW NOTE
11: 00AM   Received Vmail from pt's dtr, Therese, inquiring about clothes at time of d/c.    SW contacted Claude Arango ([phone]) / [facility] [place] - she confirmed that they can drop off clothes at [facility].  Per Claude Arango, she will reach out to [facility] AND [facility]

## 2020-10-16 NOTE — OCCUPATIONAL THERAPY NOTE
OCCUPATIONAL THERAPY TREATMENT NOTE - INPATIENT        Room Number: 348/348-A           Presenting Problem: AMS, elevated INR, acute cystitis    Problem List  Principal Problem:    Metabolic encephalopathy  Active Problems:    Azotemia    Hyperglycemia cooperative     OBJECTIVE  Precautions: Bed/chair alarm;Hard of hearing    WEIGHT BEARING RESTRICTION  Weight Bearing Restriction: None                PAIN ASSESSMENT  Ratin           ACTIVITY TOLERANCE      WNL                   O2 SATURATIONS      WN x/week    [facility] MOT/R  [address]  #[phone]

## 2020-10-16 NOTE — PROGRESS NOTES
[place] [facility] - [facility]    Progress Note    Susan Redding Patient Status:  Inpatient    1935 MRN [id number]   Location [facility] 3W/SW Attending Radha Johnson MD   Hosp Day # 4 PCP Joseph Allen MD        Subjective:     Tiffaniti with the above plan. Patient is an 82year old male with history of CAD s/p LAD PCI 2013, permanent afib, and chronic urinary retention who presented from assisted living facility with altered mental status. Found to have urosepsis, afib c RVR.  On exam has

## 2020-10-16 NOTE — PROGRESS NOTES
[facility]    Progress Note    Claudia Haji Patient Status:  Inpatient    1935 MRN [id number]   Location [facility] 3W/SW Attending Zi Branch MD   Hosp Day # 4 PCP Tamiko Sifuentes MD       Subjective:     Pt says he fe IVF     Other:  -HTN  -HL  -h/o red cell aplasia  -chronic L1 vertebral compression fx  -myasthenia gravis     dvt proph:    Elevated INR on coumadin     Code status:    Full      >35 minutes spent    Israel Carroll MD  10/16/2020

## 2020-10-17 PROCEDURE — 99232 SBSQ HOSP IP/OBS MODERATE 35: CPT | Performed by: HOSPITALIST

## 2020-10-17 NOTE — PLAN OF CARE
Patient is a/ox3, forgetful. RA. Up with assist x1 with the walker. Marcelo in place. L midline in place. Contact prec in place. IV antibiotics continued. Plan is JEMAL with insurance approval. Will continue to monitor.     Problem: Patient Centered Care  Goal: impaired and aphasic patients to use assistive/communication devices  Outcome: Progressing     Problem: Impaired Cognition  Goal: Patient will exhibit improved attention, thought processing and/or memory  Description: Interventions:  - Minimize distraction

## 2020-10-17 NOTE — PROGRESS NOTES
[facility]    Progress Note    Izzy Tierney Patient Status:  Inpatient    1935 MRN [id number]   Location [facility] 3W/SW Attending Uzair Fajardo, [address] [facility]  Day # 5 PCP Maryam Mello MD       Subjective:     No CP or SOB. monitor     Hypernatremia  Resolved.   - off IVF     Other:  -HTN  -HL  -h/o red cell aplasia  -chronic L1 vertebral compression fx  -myasthenia gravis     dvt proph:    Elevated INR on coumadin     Code status:    Full     Andres Cantu MD  10/17/2020

## 2020-10-17 NOTE — PROGRESS NOTES
[facility]    Progress Note    Claudia Haji Patient Status:  Inpatient    1935 MRN [id number]   Location [facility] 3W/SW Attending Zi Branch MD   Hosp Day # 5 PCP Tamiko Sifuentes MD        Subjective:     Constituti with changes and additions included within note (merged).   S/no cp  O/lung bcta  A/P:f/u labs  Lenient rate control afib    Vanessa Ignacio, [address] [facility]  Cardiac Electrophysiology      Gabe Rodriguez, MALCOM  10/17/2020

## 2020-10-17 NOTE — PLAN OF CARE
Patient still forgetful and impulsive overnight, but easily redirected. Midline in place, maintained IV abx. Chronic noriega in place but noticed patient is often pulling, dragging or stepping on tubing while ambulating.  Educated on proper noriega care, patien

## 2020-10-17 NOTE — PLAN OF CARE
Pt still displaying episodes of disorientation and impulsiveness. Educated multiple times on the importance of using the call light. Marcelo in place and intact. Midline placed today. IV meropenem infusing. Call light within reach.  Fall risk precautions in p emptying  Outcome: Progressing     Problem: NEUROLOGICAL - ADULT  Goal: Achieves stable or improved neurological status  Description: INTERVENTIONS  - Assess for and report changes in neurological status  - Initiate measures to prevent increased intracrani

## 2020-10-17 NOTE — CM/SW NOTE
SW received call from [employer] at [facility] who states after attempting insurance authorization this weekend, [employer] was unable to receive authorization as the insurance is closed over the weekend.      Per Paola Booth to follow up on Monday for insurance aut

## 2020-10-18 PROCEDURE — 99232 SBSQ HOSP IP/OBS MODERATE 35: CPT | Performed by: HOSPITALIST

## 2020-10-18 RX ORDER — WARFARIN SODIUM 5 MG/1
5 TABLET ORAL NIGHTLY
Status: DISCONTINUED | OUTPATIENT
Start: 2020-10-18 | End: 2020-10-19

## 2020-10-18 NOTE — PROGRESS NOTES
[facility]    Cardiology Progress Note    Gricelda Born Patient Status:  Inpatient    1935 MRN [id number]   Location [facility] 3W/SW Attending Yudy Waggoner MD   Hosp Day # 6 PCP Robertha Lennox, MD         Assessment and P 241.0 10/15/2020    CREATSERUM 0.81 10/16/2020    BUN 15 10/16/2020     10/16/2020    K 4.2 10/16/2020     10/16/2020    CO2 30.0 10/16/2020    GLU 93 10/16/2020    CA 8.5 10/16/2020    PTT 56.4 (H) 10/12/2020    INR 2.01 (H) 10/18/2020    MG 1

## 2020-10-18 NOTE — PROGRESS NOTES
[facility]    Progress Note    Ilir Diamond Patient Status:  Inpatient    1935 MRN [id number]   Location [facility] 3W/SW Attending Bev Arana MD   Hosp Day # 6 PCP Baron Shikha MD       Subjective:     Pt denies any INR     CAD s/p stent  - cont meds and monitor     Hypernatremia  Resolved.   - off IVF     Other:  -HTN  -HL  -h/o red cell aplasia  -chronic L1 vertebral compression fx  -myasthenia gravis     dvt proph:    Elevated INR on coumadin     Code status:    Ful 09-Sep-2019 21:45

## 2020-10-19 VITALS
OXYGEN SATURATION: 96 % | BODY MASS INDEX: 23.43 KG/M2 | TEMPERATURE: 98 F | HEIGHT: 72 IN | HEART RATE: 76 BPM | RESPIRATION RATE: 18 BRPM | DIASTOLIC BLOOD PRESSURE: 69 MMHG | SYSTOLIC BLOOD PRESSURE: 119 MMHG | WEIGHT: 173 LBS

## 2020-10-19 PROCEDURE — 99232 SBSQ HOSP IP/OBS MODERATE 35: CPT | Performed by: HOSPITALIST

## 2020-10-19 RX ORDER — WARFARIN SODIUM 5 MG/1
5 TABLET ORAL NIGHTLY
Qty: 30 TABLET | Refills: 0 | Status: SHIPPED | OUTPATIENT
Start: 2020-10-19

## 2020-10-19 NOTE — PROGRESS NOTES
[facility]    [facility] Note    Moira Tuckerer Patient Status:  Inpatient    1935 MRN [id number]   Location [facility] 3W/SW Attending Shellie Tian MD   Hosp Day # 7 PCP Sri Calhoun MD       Subjective:     No complaints monitor     Hypernatremia  Resolved.   - off IVF     Other:  -HTN  -HL  -h/o red cell aplasia  -chronic L1 vertebral compression fx  -myasthenia gravis     dvt proph:    Elevated INR on coumadin     Code status:    Full     Ella Delgado MD  10/19/2020

## 2020-10-19 NOTE — PLAN OF CARE
Pt remained stable overnight. On iv antibiotics. To go to rehab today.     Problem: Patient Centered Care  Goal: Patient preferences are identified and integrated in the patient's plan of care  Description: Interventions:  - What would you like us to know a Progressing     Problem: Impaired Cognition  Goal: Patient will exhibit improved attention, thought processing and/or memory  Description: Interventions:    Outcome: Progressing     Problem: Patient/Family Goals  Goal: Patient/Family Long Term Goal  Jonathon

## 2020-10-19 NOTE — PHYSICAL THERAPY NOTE
PHYSICAL THERAPY TREATMENT NOTE - INPATIENT     Room Number: 348/348-A       Presenting Problem: metabolic encephalopathy    Problem List  Principal Problem:    Metabolic encephalopathy  Active Problems:    Azotemia    Hyperglycemia    Acute cystitis witho Static Sitting: Good  Dynamic Sitting: Fair +           Static Standing: Poor +  Dynamic Standing: Poor +    ACTIVITY TOLERANCE                         O2 WALK assist device: walker - rolling at assistance level: supervision   Goal #3   Current Status    NT pt declined the use of the gait belt and was not safe to AMB without.        Goal #4     Goal #4   Current Status     Goal #5 Patient to demonstrate independen

## 2020-10-19 NOTE — DISCHARGE PLANNING
Patient is A/Ox 3, forgetful, denies CP, denies SOB. To be discharged to [address] [place] [address] today. Went over transfer instructions and importance of follow up appointments.  Explained any new and/or existing medications, their use, and side effects; specifica

## 2020-10-19 NOTE — PLAN OF CARE
Patient is a/ox3, forgetful. RA. Up with assist x1 with the walker. Marcelo in place. L midline in place. Contact prec in place. IV antibiotics continued. Plan is rehab with insurance approval. Will continue to monitor.     Problem: Patient [address] Encourage visually impaired, hearing impaired and aphasic patients to use assistive/communication devices  Outcome: Adequate for Discharge     Problem: Impaired Cognition  Goal: Patient will exhibit improved attention, thought processing and/or memory  Doug

## 2020-10-19 NOTE — OCCUPATIONAL THERAPY NOTE
OCCUPATIONAL THERAPY TREATMENT NOTE - INPATIENT        Room Number: 348/348-A           Presenting Problem: AMS, elevated INR, acute cystitis    Problem List  Principal Problem:    Metabolic encephalopathy  Active Problems:    Azotemia    Hyperglycemia ASSESSMENT  AM-PAC ‘6-Clicks’ Inpatient Daily Activity Short Form  How much help from another person does the patient currently need…  -   Putting on and taking off regular lower body clothing?: None  -   Bathing (including washing, rinsing, drying)?: ANNABEL Connor Saveanthony

## 2020-10-19 NOTE — CM/SW NOTE
11: 10AM  Received Vmail from pt's dtr, Nate Mistry, inquiring about status of insurance authorization.     SW contacted Milena gomes/ [facility] - she confirmed all clinicals have been submitted and insurance has given them a Reference # but no official author

## 2020-10-19 NOTE — PROGRESS NOTES
[facility]    Progress Note    Jose Briceño Patient Status:  Inpatient    1935 MRN [id number]   Location [facility] 3W/SW Attending Ady Turner MD   Hosp Day # 7 PCP Yariel Villagomez MD        Subjective:     Cardiovasc

## 2020-10-20 NOTE — DISCHARGE SUMMARY
[facility]    Discharge Summary    Rudy Nazario Patient Status:  Inpatient    1935 MRN [id number]   [facility] 3W/SW Attending No att. providers found   [address] Day # 7 PCP Monie You MD     Date of Admission: 10 urinary tract infection. Blood pressure upon arrival was 99/59. The patient was started on IV meropenem.   CT scan of the abdomen showed cystitis changes and L1 vertebral compression fracture with retropulsion which is chronic compared to [facility] every 8 (eight) hours for 11 days. Stop taking on: October 27, 2020  Quantity: 1 Bag  Refills: 0     vancomycin HCl 125 MG Caps  Commonly known as: VANCOCIN      Take 1 capsule (125 mg total) by mouth daily for 18 days.    Stop taking on: November 3, 2020 Extra Tabs      Take 1 tablet by mouth. Refills: 0     PEG 3350 17 g Pack  Commonly known as: MIRALAX      Take 17 g by mouth daily as needed. Refills: 0     tamsulosin HCl 0.4 MG Caps  Commonly known as: FLOMAX      Take 0.4 mg by mouth daily.    Refil to help schedule any follow up needs   Contact information:  [phone]           Aishwarya Leong MD In 1 week.     Specialties: Internal Medicine, PEDIATRICS  Why: post hospital follow up  Contact information:  [address] [facility]  [address] [place] [address] [address] [phone]

## 2020-10-20 NOTE — PAYOR COMM NOTE
--------------  DISCHARGE REVIEW    Payor: Monique Marcos  #:  [id number]  Authorization Number: [id number]    Admit date: 10/12/20  Admit time:  1803  Discharge Date: 10/19/2020  6:35 PM     Admitting Physician: Jonah Spann MD  Attending Physic

## 2021-01-21 NOTE — PROGRESS NOTES
Refused bottom skin check Render Note In Bullet Format When Appropriate: No Consent: The patient's consent was obtained including but not limited to risks of crusting, scabbing, blistering, scarring, darker or lighter pigmentary change, recurrence, incomplete removal and infection. Post-Care Instructions: I reviewed with the patient in detail post-care instructions. Patient is to wear sunprotection, and avoid picking at any of the treated lesions. Pt may apply Vaseline to crusted or scabbing areas. Detail Level: Detailed Duration Of Freeze Thaw-Cycle (Seconds): 0

## 2023-06-15 NOTE — PROGRESS NOTES
Addended by: MANDIE WARNER on: 6/15/2023 10:27 AM     Modules accepted: Orders     [place] [facility] - [facility]    Progress Note    [facility] Patient Status:  Inpatient    1935 MRN [id number]   Location [facility] 3W/SW Attending Candance Lewis, [address] [facility] [address] Day # 6 PCP Ofe Ledesma MD        Subjective:   Subjective:

## 2024-09-03 ENCOUNTER — LAB REQUISITION (OUTPATIENT)
Dept: LAB | Age: 89
End: 2024-09-03

## 2024-09-03 LAB
INR PPP: 1.6
PROTHROMBIN TIME: 17 SEC (ref 9.7–11.8)

## 2024-09-03 PROCEDURE — 85610 PROTHROMBIN TIME: CPT | Performed by: CLINICAL MEDICAL LABORATORY

## 2024-09-03 PROCEDURE — PSEU8443 PROTHROMBIN TIME (INR/PT): Performed by: CLINICAL MEDICAL LABORATORY

## 2024-09-05 ENCOUNTER — LAB REQUISITION (OUTPATIENT)
Dept: LAB | Age: 89
End: 2024-09-05

## 2024-09-05 LAB
INR PPP: 1.7
PROTHROMBIN TIME: 17.4 SEC (ref 9.7–11.8)

## 2024-09-05 PROCEDURE — 85610 PROTHROMBIN TIME: CPT | Performed by: CLINICAL MEDICAL LABORATORY

## 2024-09-05 PROCEDURE — PSEU8443 PROTHROMBIN TIME (INR/PT): Performed by: CLINICAL MEDICAL LABORATORY

## 2024-09-06 ENCOUNTER — LAB REQUISITION (OUTPATIENT)
Dept: LAB | Age: 89
End: 2024-09-06

## 2024-09-06 LAB
INR PPP: 1.9
PROTHROMBIN TIME: 19.8 SEC (ref 9.7–11.8)

## 2024-09-06 PROCEDURE — PSEU8443 PROTHROMBIN TIME (INR/PT): Performed by: CLINICAL MEDICAL LABORATORY

## 2024-09-06 PROCEDURE — 85610 PROTHROMBIN TIME: CPT | Performed by: CLINICAL MEDICAL LABORATORY

## 2024-09-16 ENCOUNTER — LAB REQUISITION (OUTPATIENT)
Dept: LAB | Age: 89
End: 2024-09-16

## 2024-09-16 LAB
INR PPP: 3.9
PROTHROMBIN TIME: 37.9 SEC (ref 9.7–11.8)

## 2024-09-16 PROCEDURE — 85610 PROTHROMBIN TIME: CPT | Performed by: CLINICAL MEDICAL LABORATORY

## 2024-09-16 PROCEDURE — PSEU8443 PROTHROMBIN TIME (INR/PT): Performed by: CLINICAL MEDICAL LABORATORY

## 2025-04-11 ENCOUNTER — LAB REQUISITION (OUTPATIENT)
Dept: LAB | Age: 89
End: 2025-04-11

## 2025-04-11 PROCEDURE — 87086 URINE CULTURE/COLONY COUNT: CPT | Performed by: CLINICAL MEDICAL LABORATORY

## 2025-04-11 PROCEDURE — 87186 SC STD MICRODIL/AGAR DIL: CPT | Performed by: CLINICAL MEDICAL LABORATORY

## 2025-04-11 PROCEDURE — 87077 CULTURE AEROBIC IDENTIFY: CPT | Performed by: CLINICAL MEDICAL LABORATORY

## 2025-04-11 PROCEDURE — PSEU9005 URINE, BACTERIAL CULTURE: Performed by: CLINICAL MEDICAL LABORATORY

## 2025-04-13 LAB — BACTERIA UR CULT: ABNORMAL

## 2025-07-04 ENCOUNTER — LAB REQUISITION (OUTPATIENT)
Dept: LAB | Age: 89
End: 2025-07-04

## 2025-07-04 PROCEDURE — 87086 URINE CULTURE/COLONY COUNT: CPT | Performed by: CLINICAL MEDICAL LABORATORY

## 2025-07-04 PROCEDURE — PSEU9005 URINE, BACTERIAL CULTURE: Performed by: CLINICAL MEDICAL LABORATORY

## 2025-07-05 LAB — BACTERIA UR CULT: NORMAL

## (undated) NOTE — LETTER
Hospital Discharge Documentation  Patient was discharged to  [facility], phone #: [phone].  No discharge summary available at this time, below is the most recent progress note  for your review .       PLAN:    From: [address] [place] [address] Dementia with sundowning - The dementia is mild and sundowning resolved.   - CT brain unremarkable  - confused and combative at times - this is much better now  - haldol prn  - treat UTI     Acute UTI  ESBL E coli UTI  BPH with urinary retention  S/p recent

## (undated) NOTE — IP AVS SNAPSHOT
[facility]            (For Outpatient Use Only) Initial Admit Date: 10/12/2020   Inpt/Obs Admit Date: Inpt: 10/12/20 / Obs: N/A   Discharge Date:    Turner Bruce:  [de-identified]   MRN: [de-identified]   CSN: [id number]   CEID: [id number]        E Hospital Account Financial Class: Medicare Advantage    October 19, 2020

## (undated) NOTE — IP AVS SNAPSHOT
Patient Demographics     Address  [address] [place] [address] Phone  [phone] (Home)  [phone] (Mobile) *Preferred*      Emergency Contact(s)     Name Relation Home Work [address] [place], [place] [address]wy of [address]    [facility] Chew 81 mg by mouth daily. atorvastatin 10 MG Tabs  Commonly known as: LIPITOR  Next dose due: Tonight       Take 10 mg by mouth nightly. Bethanechol Chloride 25 MG Tabs  Commonly known as: URECHOLINE  Next dose due:  Tonight around 9pm Take 1 capsule (125 mg total) by mouth daily for 18 days. Stop taking on: November 3, 2020   Jay Jay Patton PA-C         Vitamin B-12 500 MCG Tabs  Commonly known as: VITAMIN B12  Next dose due: Tomorrow morning      Take 500 mcg by mouth daily. [id number] Meropenem (MERREM) 500 mg in sodium chloride 0.9% 100 mL MBP 10/19/20 1550 New Bag      [id number] Metoprolol Tartrate (LOPRESSOR) tab 50 mg 10/18/20 2141 Given      [id number] Metoprolol Tartrate (LOPRESSOR) tab 50 mg 10/19/20 0434 Given      342 factor deficiency, vitamin K deficiency, liver  disease, etc. Clinical correlation is recommended.        INR 2.26 0.90 - 1.20 H [facility])   Comment:        Only the INR (not the Protime value) should be utilized for   the monitoring of oral antico Gentamicin <=1  Sensitive    Levofloxacin 1  Sensitive    Meropenem <=0.25  Sensitive    Nitrofurantoin <=16  Sensitive    Piperacillin + Tazobactam <=4  Sensitive    Trimethoprim/Sulfa >=320  Resistant                   Emergency MRSA Screen by PCR Once HISTORY OF PRESENT ILLNESS:  The patient is an 47-year-old  male who was brought in from [facility] for increased agitation and combativeness. CBC shows white blood cell count of 13.8.   Chemistry was unremarkable except for BUN and crea REVIEW OF SYSTEMS:  Unable to obtain from patient. He is very drowsy at this point. PHYSICAL EXAMINATION:    GENERAL:  Drowsy. Responds to tactile and verbal stimuli.    VITAL SIGNS:  Temperature 97.2, pulse 128, respiratory rate 21, blood pressure Consults signed by Иван Madrid MD at 10/12/2020  4:17 PM     Author: Иван Madrid MD Service: [place] Type: Physician    Filed: 10/12/2020  4:17 PM Date of Service: 10/12/2020  4:04 PM Status: Addendum    : Иван Madrid MD (Physician • OTHER (P-OTH)   tonsils   • DC XCAPSL CTRC RMVL INSJ IO LENS PROSTH W/O ECP 2012   both     Family History  Unable to obtain due to medical condition. No family history on file. Social History  Came from assisted living facility.  Unable to obtain due • Multiple Vitamins-Minerals (OCUVITE EYE + MULTI) Tab ONE TABLET BY MOUTH ONCE DAILY   • Multiple Vitamins-Minerals (OCUVITE PO) Take 1 Tab by mouth daily. • Polyethyl Glycol-Propyl Glycol (SYSTANE OPHT) Apply 1 Drop to eye(s) daily.    • polyethylene gl • cefTRIAXone  1 g Intravenous Once   • meropenem  500 mg Intravenous Once     Physical Exam:  General: Somnolent, combative. HEENT: Normocephalic, anicteric sclera, neck supple, no thyromegaly or adenopathy. Neck: No JVD, carotids 2+, no bruits.   Chaparrita Trevino Result Date: 10/12/2020  CONCLUSION:  1. Borderline cardiomegaly. Tortuous thoracic aorta. 2. Prominent bibasilar markings, left greater than right with minimal left basilar pleural reaction. Findings may be acute or chronic. No comparison studies.     D - Restart ASA and atorvastatin once taking PO    UTI  - Treatment per primary service    Thank you for allowing me to participate in the care of your patient. Mariangel Mccrary MD  Cardiology [facility]  10/12/2020[MT. 1]      Electronically signed by Jonel Killian Pt is received in the chair and was cleared for therapy session. Co treat session with OT. PPE's donned by therapist of gloves,gown and mask. [RM.1] Pt declined the use of the gait belt but did perform 2 sit<>stand transfers with the RW.  Pt was upset that t How much difficulty does the patient currently have. ..  -   Turning over in bed (including adjusting bedclothes, sheets and blankets)?: A Little   -   Sitting down on and standing up from a chair with arms (e.g., wheelchair, bedside commode, etc.): A [place] Goal #5 Patient to demonstrate independence with home activity/exercise instructions provided to patient in preparation for discharge. IN PROGRESS[RM.1]          Attribution Key    RM. 1 - Ashley Thakkar, PTA on 10/19/2020 11:28 AM  RM. 2 - EMILIANO Thakkar Care coordinated with PT-[AA. 1]Patient's activity limited; he has decreased insight, problem solving, and safety awareness; therapists educated patient on goals for therapy and safe participation in rehab while IP- patient not receptive to use of gait belt -   Toileting, which includes using toilet, bedpan or urinal? : A Little  -   Putting on and taking off regular upper body clothing?: A Little  -   Taking care of personal grooming such as brushing teeth?: A Little  -   Eating meals?: A Little[AA. 2]    AM- - See additional Care Plan goals for specific interventions